# Patient Record
Sex: FEMALE | Race: BLACK OR AFRICAN AMERICAN
[De-identification: names, ages, dates, MRNs, and addresses within clinical notes are randomized per-mention and may not be internally consistent; named-entity substitution may affect disease eponyms.]

---

## 2018-02-04 ENCOUNTER — HOSPITAL ENCOUNTER (EMERGENCY)
Dept: HOSPITAL 57 - BURERS | Age: 28
Discharge: HOME | End: 2018-02-04
Payer: COMMERCIAL

## 2018-02-04 DIAGNOSIS — F32.9: Primary | ICD-10-CM

## 2018-02-04 DIAGNOSIS — R21: ICD-10-CM

## 2018-02-04 PROCEDURE — 96372 THER/PROPH/DIAG INJ SC/IM: CPT

## 2018-02-04 PROCEDURE — 36416 COLLJ CAPILLARY BLOOD SPEC: CPT

## 2018-08-23 ENCOUNTER — HOSPITAL ENCOUNTER (EMERGENCY)
Dept: HOSPITAL 92 - ERS | Age: 28
Discharge: HOME | End: 2018-08-23
Payer: COMMERCIAL

## 2018-08-23 DIAGNOSIS — F32.9: ICD-10-CM

## 2018-08-23 DIAGNOSIS — N39.0: Primary | ICD-10-CM

## 2018-08-23 DIAGNOSIS — N93.9: ICD-10-CM

## 2018-08-23 LAB
ANION GAP SERPL CALC-SCNC: 14 MMOL/L (ref 10–20)
BACTERIA UR QL AUTO: (no result) HPF
BASOPHILS # BLD AUTO: 0.1 THOU/UL (ref 0–0.2)
BASOPHILS NFR BLD AUTO: 1.3 % (ref 0–1)
BUN SERPL-MCNC: 14 MG/DL (ref 7–18.7)
CALCIUM SERPL-MCNC: 9.4 MG/DL (ref 7.8–10.44)
CHLORIDE SERPL-SCNC: 107 MMOL/L (ref 98–107)
CO2 SERPL-SCNC: 22 MMOL/L (ref 22–29)
CREAT CL PREDICTED SERPL C-G-VRATE: 0 ML/MIN (ref 70–130)
CRYSTAL-AUWI FLAG: 0 (ref 0–15)
EOSINOPHIL # BLD AUTO: 0.1 THOU/UL (ref 0–0.7)
EOSINOPHIL NFR BLD AUTO: 2.1 % (ref 0–10)
GLUCOSE SERPL-MCNC: 95 MG/DL (ref 70–105)
HEV IGM SER QL: 12.8 (ref 0–7.99)
HGB BLD-MCNC: 9.4 G/DL (ref 12–16)
HYALINE CASTS #/AREA URNS LPF: (no result) LPF
LYMPHOCYTES # BLD: 2.1 THOU/UL (ref 1.2–3.4)
LYMPHOCYTES NFR BLD AUTO: 32.1 % (ref 21–51)
MCH RBC QN AUTO: 25.1 PG (ref 27–31)
MCV RBC AUTO: 78.3 FL (ref 78–98)
MONOCYTES # BLD AUTO: 0.4 THOU/UL (ref 0.11–0.59)
MONOCYTES NFR BLD AUTO: 5.7 % (ref 0–10)
NEUTROPHILS # BLD AUTO: 3.8 THOU/UL (ref 1.4–6.5)
NEUTROPHILS NFR BLD AUTO: 58.8 % (ref 42–75)
PATHC CAST-AUWI FLAG: 0.88 (ref 0–2.49)
PLATELET # BLD AUTO: 191 THOU/UL (ref 130–400)
POTASSIUM SERPL-SCNC: 4.2 MMOL/L (ref 3.5–5.1)
PROT UR STRIP.AUTO-MCNC: 100 MG/DL
RBC # BLD AUTO: 3.75 MILL/UL (ref 4.2–5.4)
RBC UR QL AUTO: (no result) HPF (ref 0–3)
SODIUM SERPL-SCNC: 139 MMOL/L (ref 136–145)
SP GR UR STRIP: 1.03 (ref 1–1.04)
SPERM-AUWI FLAG: 0 (ref 0–9.9)
TROPONIN I SERPL DL<=0.01 NG/ML-MCNC: (no result) NG/ML (ref ?–0.03)
WBC # BLD AUTO: 6.5 THOU/UL (ref 4.8–10.8)
YEAST-AUWI FLAG: 167.3 (ref 0–25)

## 2018-08-23 PROCEDURE — 87077 CULTURE AEROBIC IDENTIFY: CPT

## 2018-08-23 PROCEDURE — 84702 CHORIONIC GONADOTROPIN TEST: CPT

## 2018-08-23 PROCEDURE — 87086 URINE CULTURE/COLONY COUNT: CPT

## 2018-08-23 PROCEDURE — 85025 COMPLETE CBC W/AUTO DIFF WBC: CPT

## 2018-08-23 PROCEDURE — 84484 ASSAY OF TROPONIN QUANT: CPT

## 2018-08-23 PROCEDURE — 81015 MICROSCOPIC EXAM OF URINE: CPT

## 2018-08-23 PROCEDURE — 81003 URINALYSIS AUTO W/O SCOPE: CPT

## 2018-08-23 PROCEDURE — 96360 HYDRATION IV INFUSION INIT: CPT

## 2018-08-23 PROCEDURE — 86900 BLOOD TYPING SEROLOGIC ABO: CPT

## 2018-08-23 PROCEDURE — 76856 US EXAM PELVIC COMPLETE: CPT

## 2018-08-23 PROCEDURE — 86901 BLOOD TYPING SEROLOGIC RH(D): CPT

## 2018-08-23 PROCEDURE — 80048 BASIC METABOLIC PNL TOTAL CA: CPT

## 2018-08-23 PROCEDURE — 93005 ELECTROCARDIOGRAM TRACING: CPT

## 2018-08-23 PROCEDURE — 36415 COLL VENOUS BLD VENIPUNCTURE: CPT

## 2018-08-23 NOTE — ULT
ULTRASOUND PELVIC TRANSVAGINAL

8/23/18

 

HISTORY: 

Vaginal bleeding. Missed period. 

 

COMPARISON:  

None.

 

TECHNIQUE:  

Real time gray scale, color doppler and spectral analysis of the pelvis is performed in the transabdo
jeni approach. 

 

Transvaginal approach was also used. 

 

The uterus measures 8.9 x 4.1 x 6.1 cm. Endometrial thickness is 7 mm. 

 

Right ovary measures 3.4 x 2.6 x 2.5 cm and left ovary measures 2.5 x 2 x 3 cm. 

 

No significant free fluid in the pelvis. 

 

There are Nabothian cysts of the cervix.

 

IMPRESSION:  

Unremarkable exam. 

 

POS: [facility]

## 2020-07-01 ENCOUNTER — HOSPITAL ENCOUNTER (EMERGENCY)
Dept: HOSPITAL 92 - ERS | Age: 30
Discharge: HOME | End: 2020-07-01
Payer: COMMERCIAL

## 2020-07-01 DIAGNOSIS — F32.9: ICD-10-CM

## 2020-07-01 DIAGNOSIS — U07.1: Primary | ICD-10-CM

## 2020-07-01 PROCEDURE — 71045 X-RAY EXAM CHEST 1 VIEW: CPT

## 2020-07-01 PROCEDURE — U0003 INFECTIOUS AGENT DETECTION BY NUCLEIC ACID (DNA OR RNA); SEVERE ACUTE RESPIRATORY SYNDROME CORONAVIRUS 2 (SARS-COV-2) (CORONAVIRUS DISEASE [COVID-19]), AMPLIFIED PROBE TECHNIQUE, MAKING USE OF HIGH THROUGHPUT TECHNOLOGIES AS DESCRIBED BY CMS-2020-01-R: HCPCS

## 2020-07-01 PROCEDURE — 87635 SARS-COV-2 COVID-19 AMP PRB: CPT

## 2020-07-01 NOTE — RAD
XR Chest 1 View Portable



History: Dyspnea



Comparison: Radiograph 2013



Findings: Heart size upper limits of normal. Mild atelectatic changes and poor inspiratory effort. No
 acute osseous abnormality.



Impression: No acute intrathoracic abnormality.



Reported By: Jevon Reid 

Electronically Signed:  7/1/2020 9:38 PM